# Patient Record
Sex: MALE | Race: WHITE | NOT HISPANIC OR LATINO | URBAN - METROPOLITAN AREA
[De-identification: names, ages, dates, MRNs, and addresses within clinical notes are randomized per-mention and may not be internally consistent; named-entity substitution may affect disease eponyms.]

---

## 2022-07-09 ENCOUNTER — TELEPHONE ENCOUNTER (OUTPATIENT)
Dept: URBAN - METROPOLITAN AREA CLINIC 121 | Facility: CLINIC | Age: 37
End: 2022-07-09

## 2022-07-10 ENCOUNTER — TELEPHONE ENCOUNTER (OUTPATIENT)
Dept: URBAN - METROPOLITAN AREA CLINIC 121 | Facility: CLINIC | Age: 37
End: 2022-07-10

## 2023-06-08 ENCOUNTER — TELEPHONE ENCOUNTER (OUTPATIENT)
Dept: URBAN - METROPOLITAN AREA CLINIC 7 | Facility: CLINIC | Age: 38
End: 2023-06-08

## 2023-06-09 ENCOUNTER — WEB ENCOUNTER (OUTPATIENT)
Dept: URBAN - METROPOLITAN AREA CLINIC 7 | Facility: CLINIC | Age: 38
End: 2023-06-09

## 2023-06-09 ENCOUNTER — LAB OUTSIDE AN ENCOUNTER (OUTPATIENT)
Dept: URBAN - METROPOLITAN AREA CLINIC 7 | Facility: CLINIC | Age: 38
End: 2023-06-09

## 2023-06-09 ENCOUNTER — OFFICE VISIT (OUTPATIENT)
Dept: URBAN - METROPOLITAN AREA CLINIC 7 | Facility: CLINIC | Age: 38
End: 2023-06-09
Payer: COMMERCIAL

## 2023-06-09 VITALS
WEIGHT: 240 LBS | DIASTOLIC BLOOD PRESSURE: 82 MMHG | SYSTOLIC BLOOD PRESSURE: 130 MMHG | TEMPERATURE: 98 F | HEIGHT: 72 IN | BODY MASS INDEX: 32.51 KG/M2

## 2023-06-09 DIAGNOSIS — R68.81 EARLY SATIETY: ICD-10-CM

## 2023-06-09 DIAGNOSIS — K86.89 PANCREATIC INSUFFICIENCY: ICD-10-CM

## 2023-06-09 DIAGNOSIS — K92.1 HEMATOCHEZIA: ICD-10-CM

## 2023-06-09 DIAGNOSIS — R10.84 GENERALIZED ABDOMINAL PAIN: ICD-10-CM

## 2023-06-09 DIAGNOSIS — R10.13 EPIGASTRIC PAIN: ICD-10-CM

## 2023-06-09 DIAGNOSIS — A04.8 H. PYLORI INFECTION: ICD-10-CM

## 2023-06-09 DIAGNOSIS — R19.4 CHANGE IN BOWEL HABITS: ICD-10-CM

## 2023-06-09 PROBLEM — 37992001: Status: ACTIVE | Noted: 2023-06-09

## 2023-06-09 PROBLEM — 721730009: Status: ACTIVE | Noted: 2023-06-09

## 2023-06-09 PROBLEM — 102614006: Status: ACTIVE | Noted: 2023-06-09

## 2023-06-09 PROBLEM — 88111009: Status: ACTIVE | Noted: 2023-06-09

## 2023-06-09 PROBLEM — 449341000124102: Status: ACTIVE | Noted: 2023-06-09

## 2023-06-09 PROBLEM — 442076002: Status: ACTIVE | Noted: 2023-06-09

## 2023-06-09 PROBLEM — 79922009: Status: ACTIVE | Noted: 2023-06-09

## 2023-06-09 PROCEDURE — 99204 OFFICE O/P NEW MOD 45 MIN: CPT | Performed by: INTERNAL MEDICINE

## 2023-06-09 NOTE — HPI-TODAY'S VISIT:
Patient  of Dr Muir with longstanding hx of gi sxs and prior dx of IBSd and lactose intolerance. Recent stool testing + for h pylori and is currently on rx intergrative rx for this per Dr Muir  Also has mild decrease in fecal pancreatic elastase. Has noted exacerbation of gi complaints since initiated rx with  a complaint of abdominal  pain and change in bowel habits. Had acute onset hematochezia yesterday. The pain is of  days years  duration. The pain is described as sharp, severe constant pain yesterday but today more intermittent and dull. It is localized to the  epigastrium,LUQ. It is non radiating . Exacerbating factors - eating.Has noted nausea,dyspepsia and that sxs worse postprandially. Higher fat meals are worse and now taking enzyme replacement with some improvement. Unrelated to change in position ,activity. Relieving factors - none. Severity  moderate,severe. Associated with nausea and intermittent vomiting . Has noted early satiety and dyspepsia. No recent fevers or chills Aprox 10lb  weight loss over last week but has aprox 40lb weight loss x last  year. No dark urine or alcoholic stools. No significant NSAID use history.

## 2023-06-15 ENCOUNTER — CLAIMS CREATED FROM THE CLAIM WINDOW (OUTPATIENT)
Dept: URBAN - METROPOLITAN AREA CLINIC 4 | Facility: CLINIC | Age: 38
End: 2023-06-15
Payer: COMMERCIAL

## 2023-06-15 ENCOUNTER — CLAIMS CREATED FROM THE CLAIM WINDOW (OUTPATIENT)
Dept: URBAN - METROPOLITAN AREA SURGERY CENTER 5 | Facility: SURGERY CENTER | Age: 38
End: 2023-06-15
Payer: COMMERCIAL

## 2023-06-15 DIAGNOSIS — R10.13 EPIGASTRIC PAIN: ICD-10-CM

## 2023-06-15 DIAGNOSIS — Z87.19 PERSONAL HISTORY OF OTHER DISEASES OF THE DIGESTIVE SYSTEM: ICD-10-CM

## 2023-06-15 DIAGNOSIS — K29.70 GASTRITIS, UNSPECIFIED, WITHOUT BLEEDING: ICD-10-CM

## 2023-06-15 DIAGNOSIS — K31.89 OTHER DISEASES OF STOMACH AND DUODENUM: ICD-10-CM

## 2023-06-15 PROCEDURE — 88305 TISSUE EXAM BY PATHOLOGIST: CPT | Performed by: PATHOLOGY

## 2023-06-15 PROCEDURE — 00731 ANES UPR GI NDSC PX NOS: CPT | Performed by: NURSE ANESTHETIST, CERTIFIED REGISTERED

## 2023-06-15 PROCEDURE — 43239 EGD BIOPSY SINGLE/MULTIPLE: CPT | Performed by: INTERNAL MEDICINE

## 2023-06-15 PROCEDURE — 88312 SPECIAL STAINS GROUP 1: CPT | Performed by: PATHOLOGY

## 2023-06-22 ENCOUNTER — CLAIMS CREATED FROM THE CLAIM WINDOW (OUTPATIENT)
Dept: URBAN - METROPOLITAN AREA SURGERY CENTER 5 | Facility: SURGERY CENTER | Age: 38
End: 2023-06-22
Payer: COMMERCIAL

## 2023-06-22 DIAGNOSIS — K63.5 POLYP OF ASCENDING COLON, UNSPECIFIED TYPE: ICD-10-CM

## 2023-06-22 DIAGNOSIS — K63.5 INFLAMMATORY COLON POLYP: ICD-10-CM

## 2023-06-22 DIAGNOSIS — K92.1 HEMATOCHEZIA: ICD-10-CM

## 2023-06-22 DIAGNOSIS — K64.1 SECOND DEGREE HEMORRHOIDS: ICD-10-CM

## 2023-06-22 PROCEDURE — 00811 ANES LWR INTST NDSC NOS: CPT | Performed by: NURSE ANESTHETIST, CERTIFIED REGISTERED

## 2023-06-22 PROCEDURE — 45385 COLONOSCOPY W/LESION REMOVAL: CPT | Performed by: INTERNAL MEDICINE

## 2023-07-06 ENCOUNTER — TELEPHONE ENCOUNTER (OUTPATIENT)
Dept: URBAN - METROPOLITAN AREA CLINIC 7 | Facility: CLINIC | Age: 38
End: 2023-07-06

## 2023-07-13 ENCOUNTER — LAB OUTSIDE AN ENCOUNTER (OUTPATIENT)
Dept: URBAN - METROPOLITAN AREA CLINIC 7 | Facility: CLINIC | Age: 38
End: 2023-07-13

## 2023-07-13 ENCOUNTER — TELEPHONE ENCOUNTER (OUTPATIENT)
Dept: URBAN - METROPOLITAN AREA CLINIC 7 | Facility: CLINIC | Age: 38
End: 2023-07-13

## 2023-07-24 ENCOUNTER — OFFICE VISIT (OUTPATIENT)
Dept: URBAN - METROPOLITAN AREA CLINIC 7 | Facility: CLINIC | Age: 38
End: 2023-07-24
Payer: COMMERCIAL

## 2023-07-24 VITALS
TEMPERATURE: 98 F | DIASTOLIC BLOOD PRESSURE: 74 MMHG | HEART RATE: 80 BPM | SYSTOLIC BLOOD PRESSURE: 122 MMHG | HEIGHT: 72 IN

## 2023-07-24 DIAGNOSIS — K64.1 GRADE II HEMORRHOIDS: ICD-10-CM

## 2023-07-24 PROCEDURE — 46221 LIGATION OF HEMORRHOID(S): CPT | Performed by: INTERNAL MEDICINE

## 2023-08-01 ENCOUNTER — TELEPHONE ENCOUNTER (OUTPATIENT)
Dept: URBAN - METROPOLITAN AREA CLINIC 7 | Facility: CLINIC | Age: 38
End: 2023-08-01

## 2023-08-07 ENCOUNTER — OFFICE VISIT (OUTPATIENT)
Dept: URBAN - METROPOLITAN AREA CLINIC 7 | Facility: CLINIC | Age: 38
End: 2023-08-07
Payer: COMMERCIAL

## 2023-08-07 VITALS
RESPIRATION RATE: 99 BRPM | HEIGHT: 72 IN | TEMPERATURE: 98 F | HEART RATE: 74 BPM | SYSTOLIC BLOOD PRESSURE: 116 MMHG | DIASTOLIC BLOOD PRESSURE: 68 MMHG

## 2023-08-07 DIAGNOSIS — K64.1 GRADE II HEMORRHOIDS: ICD-10-CM

## 2023-08-07 PROCEDURE — 46221 LIGATION OF HEMORRHOID(S): CPT | Performed by: INTERNAL MEDICINE

## 2023-08-08 ENCOUNTER — TELEPHONE ENCOUNTER (OUTPATIENT)
Dept: URBAN - METROPOLITAN AREA CLINIC 7 | Facility: CLINIC | Age: 38
End: 2023-08-08

## 2023-08-22 ENCOUNTER — OFFICE VISIT (OUTPATIENT)
Dept: URBAN - METROPOLITAN AREA CLINIC 7 | Facility: CLINIC | Age: 38
End: 2023-08-22
Payer: COMMERCIAL

## 2023-08-22 VITALS
RESPIRATION RATE: 68 BRPM | SYSTOLIC BLOOD PRESSURE: 116 MMHG | TEMPERATURE: 98 F | DIASTOLIC BLOOD PRESSURE: 80 MMHG | HEIGHT: 72 IN

## 2023-08-22 DIAGNOSIS — K64.1 GRADE II HEMORRHOIDS: ICD-10-CM

## 2023-08-22 PROCEDURE — 46221 LIGATION OF HEMORRHOID(S): CPT | Performed by: INTERNAL MEDICINE

## 2023-09-21 ENCOUNTER — TELEPHONE ENCOUNTER (OUTPATIENT)
Dept: URBAN - METROPOLITAN AREA CLINIC 7 | Facility: CLINIC | Age: 38
End: 2023-09-21

## 2023-09-22 ENCOUNTER — OFFICE VISIT (OUTPATIENT)
Dept: URBAN - METROPOLITAN AREA CLINIC 7 | Facility: CLINIC | Age: 38
End: 2023-09-22
Payer: COMMERCIAL

## 2023-09-22 ENCOUNTER — LAB OUTSIDE AN ENCOUNTER (OUTPATIENT)
Dept: URBAN - METROPOLITAN AREA CLINIC 7 | Facility: CLINIC | Age: 38
End: 2023-09-22

## 2023-09-22 VITALS
DIASTOLIC BLOOD PRESSURE: 82 MMHG | HEIGHT: 72 IN | WEIGHT: 231 LBS | TEMPERATURE: 98.9 F | SYSTOLIC BLOOD PRESSURE: 130 MMHG | BODY MASS INDEX: 31.29 KG/M2

## 2023-09-22 DIAGNOSIS — Z86.19 HISTORY OF HELICOBACTER PYLORI INFECTION: ICD-10-CM

## 2023-09-22 DIAGNOSIS — R10.32 LLQ ABDOMINAL PAIN: ICD-10-CM

## 2023-09-22 DIAGNOSIS — R11.0 NAUSEA: ICD-10-CM

## 2023-09-22 DIAGNOSIS — K86.89 PANCREATIC INSUFFICIENCY: ICD-10-CM

## 2023-09-22 PROBLEM — 422587007: Status: ACTIVE | Noted: 2023-09-22

## 2023-09-22 PROBLEM — 15627741000119108: Status: ACTIVE | Noted: 2023-09-22

## 2023-09-22 PROBLEM — 301716002: Status: ACTIVE | Noted: 2023-09-22

## 2023-09-22 PROCEDURE — 99214 OFFICE O/P EST MOD 30 MIN: CPT | Performed by: INTERNAL MEDICINE

## 2023-09-22 RX ORDER — PSYLLIUM SEED
AS DIRECTED PACKET (EA) ORAL
Status: ACTIVE | COMMUNITY

## 2023-09-22 NOTE — HPI-TODAY'S VISIT:
Seen back now with recurrent intermittent LLQ pain Sharp  Gen 3/10 with intermittent exacerbation up to 7/10 No clear exacerbating or relieving factors When increased will have nausea. No emesis No bleeding Colonosocpy 6/2023 without finding in LLQ. EGD bx negative for h pylori.   Patient  of Dr Muir  last seen with longstanding hx of gi sxs and prior dx of IBSd and lactose intolerance. Recent stool testing + for h pylori and is currently on rx intergrative rx for this per Dr Muir  Also has mild decrease in fecal pancreatic elastase. Has noted exacerbation of gi complaints since initiated rx with  a complaint of abdominal  pain and change in bowel habits. Had acute onset hematochezia yesterday. The pain is of  days years  duration. The pain is described as sharp, severe constant pain yesterday but today more intermittent and dull. It is localized to the  epigastrium,LUQ. It is non radiating . Exacerbating factors - eating.Has noted nausea,dyspepsia and that sxs worse postprandially. Higher fat meals are worse and now taking enzyme replacement with some improvement. Unrelated to change in position ,activity. Relieving factors - none. Severity  moderate,severe. Associated with nausea and intermittent vomiting . Has noted early satiety and dyspepsia. No recent fevers or chills Aprox 10lb  weight loss over last week but has aprox 40lb weight loss x last  year. No dark urine or alcoholic stools. No significant NSAID use history.

## 2023-10-02 ENCOUNTER — LAB OUTSIDE AN ENCOUNTER (OUTPATIENT)
Dept: URBAN - METROPOLITAN AREA CLINIC 7 | Facility: CLINIC | Age: 38
End: 2023-10-02

## 2023-10-03 ENCOUNTER — TELEPHONE ENCOUNTER (OUTPATIENT)
Dept: URBAN - METROPOLITAN AREA CLINIC 7 | Facility: CLINIC | Age: 38
End: 2023-10-03

## 2023-10-16 ENCOUNTER — OFFICE VISIT (OUTPATIENT)
Dept: URBAN - METROPOLITAN AREA CLINIC 7 | Facility: CLINIC | Age: 38
End: 2023-10-16
Payer: COMMERCIAL

## 2023-10-16 ENCOUNTER — DASHBOARD ENCOUNTERS (OUTPATIENT)
Age: 38
End: 2023-10-16

## 2023-10-16 ENCOUNTER — TELEPHONE ENCOUNTER (OUTPATIENT)
Dept: URBAN - METROPOLITAN AREA CLINIC 7 | Facility: CLINIC | Age: 38
End: 2023-10-16

## 2023-10-16 VITALS
TEMPERATURE: 98 F | DIASTOLIC BLOOD PRESSURE: 76 MMHG | SYSTOLIC BLOOD PRESSURE: 122 MMHG | WEIGHT: 236 LBS | BODY MASS INDEX: 31.97 KG/M2 | HEIGHT: 72 IN

## 2023-10-16 DIAGNOSIS — Z86.19 HISTORY OF HELICOBACTER PYLORI INFECTION: ICD-10-CM

## 2023-10-16 DIAGNOSIS — K86.89 PANCREATIC INSUFFICIENCY: ICD-10-CM

## 2023-10-16 DIAGNOSIS — A04.8 H. PYLORI INFECTION: ICD-10-CM

## 2023-10-16 DIAGNOSIS — K57.92 ACUTE DIVERTICULITIS: ICD-10-CM

## 2023-10-16 PROBLEM — 735593008: Status: ACTIVE | Noted: 2023-10-16

## 2023-10-16 PROCEDURE — 99214 OFFICE O/P EST MOD 30 MIN: CPT | Performed by: INTERNAL MEDICINE

## 2023-10-16 RX ORDER — PANCRELIPASE LIPASE, PANCRELIPASE AMYLASE, AND PANCRELIPASE PROTEASE 21000; 83900; 54700 [USP'U]/1; [USP'U]/1; [USP'U]/1
ONE TABLET WITH EACH MEAL DAILY CAPSULE, DELAYED RELEASE ORAL
Qty: 90 | Refills: 11 | OUTPATIENT
Start: 2023-10-19 | End: 2024-10-13

## 2023-10-16 RX ORDER — PSYLLIUM SEED
AS DIRECTED PACKET (EA) ORAL
Status: ACTIVE | COMMUNITY

## 2023-10-16 RX ORDER — AMOXICILLIN AND CLAVULANATE POTASSIUM 875; 125 MG/1; MG/1
1 TABLET TABLET, FILM COATED ORAL
Qty: 20 | Refills: 0 | OUTPATIENT
Start: 2023-10-16 | End: 2023-10-26

## 2023-10-16 RX ORDER — PANCRELIPASE LIPASE, PANCRELIPASE PROTEASE, PANCRELIPASE AMYLASE 10000; 32000; 42000 [USP'U]/1; [USP'U]/1; [USP'U]/1
1WITH MEALS CAPSULE, DELAYED RELEASE ORAL
Qty: 90 | Refills: 0 | OUTPATIENT
Start: 2023-10-16 | End: 2023-11-14

## 2023-10-16 RX ORDER — METRONIDAZOLE 500 MG/1
1 TABLET TABLET ORAL THREE TIMES A DAY
Qty: 30 | Refills: 0 | OUTPATIENT
Start: 2023-10-16 | End: 2023-10-26

## 2023-10-16 NOTE — HPI-TODAY'S VISIT:
Seen back now with vague LUQ/LLQ/Flank pain. Intermittent ,mild and recurrent  Last seen with  LLQ pain and had  CT with mild diverticulitis. Sharp  Gen 3/10 with intermittent exacerbation up to 7/10 No clear exacerbating or relieving factors When increased will have nausea. No emesis No bleeding Colonosocpy 6/2023 without finding in LLQ. EGD bx negative for h pylori.   Patient  of Dr Muir  last seen with longstanding hx of gi sxs and prior dx of IBSd and lactose intolerance. Recent stool testing + for h pylori and is currently on rx intergrative rx for this per Dr Muir  Also has mild decrease in fecal pancreatic elastase. Has noted exacerbation of gi complaints since initiated rx with  a complaint of abdominal  pain and change in bowel habits. Had acute onset hematochezia yesterday. The pain is of  days years  duration. The pain is described as sharp, severe constant pain yesterday but today more intermittent and dull. It is localized to the  epigastrium,LUQ. It is non radiating . Exacerbating factors - eating.Has noted nausea,dyspepsia and that sxs worse postprandially. Higher fat meals are worse and now taking enzyme replacement with some improvement. Unrelated to change in position ,activity. Relieving factors - none. Severity  moderate,severe. Associated with nausea and intermittent vomiting . Has noted early satiety and dyspepsia. No recent fevers or chills Aprox 10lb  weight loss over last week but has aprox 40lb weight loss x last  year. No dark urine or alcoholic stools. No significant NSAID use history.

## 2023-10-20 ENCOUNTER — TELEPHONE ENCOUNTER (OUTPATIENT)
Dept: URBAN - METROPOLITAN AREA CLINIC 7 | Facility: CLINIC | Age: 38
End: 2023-10-20

## 2023-10-20 RX ORDER — PANCRELIPASE LIPASE, PANCRELIPASE PROTEASE, PANCRELIPASE AMYLASE 10000; 32000; 42000 [USP'U]/1; [USP'U]/1; [USP'U]/1
1WITH MEALS CAPSULE, DELAYED RELEASE ORAL
Start: 2023-10-16

## 2023-11-06 ENCOUNTER — TELEPHONE ENCOUNTER (OUTPATIENT)
Dept: URBAN - METROPOLITAN AREA CLINIC 7 | Facility: CLINIC | Age: 38
End: 2023-11-06

## 2023-11-15 ENCOUNTER — TELEPHONE ENCOUNTER (OUTPATIENT)
Dept: URBAN - METROPOLITAN AREA CLINIC 7 | Facility: CLINIC | Age: 38
End: 2023-11-15

## 2023-11-15 ENCOUNTER — LAB OUTSIDE AN ENCOUNTER (OUTPATIENT)
Dept: URBAN - METROPOLITAN AREA CLINIC 7 | Facility: CLINIC | Age: 38
End: 2023-11-15

## 2023-11-15 RX ORDER — AMOXICILLIN AND CLAVULANATE POTASSIUM 500; 125 MG/1; 1/1
1 TABLET TABLET, FILM COATED ORAL THREE TIMES A DAY
Qty: 30 TABLET | Refills: 0 | OUTPATIENT
Start: 2023-11-15 | End: 2023-11-25

## 2023-11-16 ENCOUNTER — TELEPHONE ENCOUNTER (OUTPATIENT)
Dept: URBAN - METROPOLITAN AREA CLINIC 7 | Facility: CLINIC | Age: 38
End: 2023-11-16

## 2023-11-20 ENCOUNTER — TELEPHONE ENCOUNTER (OUTPATIENT)
Dept: URBAN - METROPOLITAN AREA CLINIC 7 | Facility: CLINIC | Age: 38
End: 2023-11-20

## 2023-11-20 RX ORDER — PANCRELIPASE LIPASE, PANCRELIPASE PROTEASE, PANCRELIPASE AMYLASE 10000; 32000; 42000 [USP'U]/1; [USP'U]/1; [USP'U]/1
1WITH MEALS CAPSULE, DELAYED RELEASE ORAL
Start: 2023-10-16

## 2023-11-21 ENCOUNTER — TELEPHONE ENCOUNTER (OUTPATIENT)
Dept: URBAN - METROPOLITAN AREA CLINIC 7 | Facility: CLINIC | Age: 38
End: 2023-11-21

## 2023-12-29 ENCOUNTER — TELEPHONE ENCOUNTER (OUTPATIENT)
Dept: URBAN - METROPOLITAN AREA CLINIC 7 | Facility: CLINIC | Age: 38
End: 2023-12-29

## 2024-01-09 ENCOUNTER — TELEPHONE ENCOUNTER (OUTPATIENT)
Dept: URBAN - METROPOLITAN AREA CLINIC 7 | Facility: CLINIC | Age: 39
End: 2024-01-09

## 2024-01-12 ENCOUNTER — TELEPHONE ENCOUNTER (OUTPATIENT)
Dept: URBAN - METROPOLITAN AREA CLINIC 7 | Facility: CLINIC | Age: 39
End: 2024-01-12

## 2024-01-12 ENCOUNTER — LAB OUTSIDE AN ENCOUNTER (OUTPATIENT)
Dept: URBAN - METROPOLITAN AREA CLINIC 7 | Facility: CLINIC | Age: 39
End: 2024-01-12

## 2024-01-29 ENCOUNTER — TELEPHONE ENCOUNTER (OUTPATIENT)
Dept: URBAN - METROPOLITAN AREA CLINIC 7 | Facility: CLINIC | Age: 39
End: 2024-01-29

## 2024-01-29 RX ORDER — ONDANSETRON 4 MG/1
1 TABLET ON THE TONGUE AND ALLOW TO DISSOLVE TABLET, ORALLY DISINTEGRATING ORAL ONCE A DAY
Qty: 30 | Refills: 0 | OUTPATIENT
Start: 2024-01-29

## 2024-05-08 ENCOUNTER — TELEPHONE ENCOUNTER (OUTPATIENT)
Dept: URBAN - METROPOLITAN AREA CLINIC 9 | Facility: CLINIC | Age: 39
End: 2024-05-08

## 2024-05-08 ENCOUNTER — WEB ENCOUNTER (OUTPATIENT)
Dept: URBAN - METROPOLITAN AREA CLINIC 7 | Facility: CLINIC | Age: 39
End: 2024-05-08

## 2024-05-08 RX ORDER — AMOXICILLIN AND CLAVULANATE POTASSIUM 500; 125 MG/1; 1/1
1 TABLET TABLET, FILM COATED ORAL
Qty: 30 TABLET | Refills: 0 | OUTPATIENT
Start: 2024-05-08 | End: 2024-05-18

## 2024-06-17 ENCOUNTER — TELEPHONE ENCOUNTER (OUTPATIENT)
Dept: URBAN - METROPOLITAN AREA CLINIC 7 | Facility: CLINIC | Age: 39
End: 2024-06-17

## 2024-07-05 ENCOUNTER — OFFICE VISIT (OUTPATIENT)
Dept: URBAN - METROPOLITAN AREA CLINIC 7 | Facility: CLINIC | Age: 39
End: 2024-07-05

## 2024-07-10 ENCOUNTER — WEB ENCOUNTER (OUTPATIENT)
Dept: URBAN - METROPOLITAN AREA CLINIC 7 | Facility: CLINIC | Age: 39
End: 2024-07-10

## 2024-07-10 RX ORDER — ONDANSETRON 4 MG/1
1 TABLET ON THE TONGUE AND ALLOW TO DISSOLVE TABLET, ORALLY DISINTEGRATING ORAL ONCE A DAY
Qty: 30 | Refills: 0
Start: 2024-01-29

## 2024-07-16 ENCOUNTER — OFFICE VISIT (OUTPATIENT)
Dept: URBAN - METROPOLITAN AREA CLINIC 7 | Facility: CLINIC | Age: 39
End: 2024-07-16

## 2024-07-16 ENCOUNTER — LAB OUTSIDE AN ENCOUNTER (OUTPATIENT)
Dept: URBAN - METROPOLITAN AREA CLINIC 7 | Facility: CLINIC | Age: 39
End: 2024-07-16

## 2024-07-16 ENCOUNTER — OFFICE VISIT (OUTPATIENT)
Dept: URBAN - METROPOLITAN AREA CLINIC 7 | Facility: CLINIC | Age: 39
End: 2024-07-16
Payer: COMMERCIAL

## 2024-07-16 VITALS
TEMPERATURE: 97.6 F | BODY MASS INDEX: 32.23 KG/M2 | SYSTOLIC BLOOD PRESSURE: 130 MMHG | DIASTOLIC BLOOD PRESSURE: 90 MMHG | HEIGHT: 72 IN | WEIGHT: 238 LBS

## 2024-07-16 DIAGNOSIS — Z86.19 HISTORY OF HELICOBACTER PYLORI INFECTION: ICD-10-CM

## 2024-07-16 DIAGNOSIS — K59.09 INTERMITTENT CONSTIPATION: ICD-10-CM

## 2024-07-16 DIAGNOSIS — K86.89 PANCREATIC INSUFFICIENCY: ICD-10-CM

## 2024-07-16 DIAGNOSIS — K64.9 BLEEDING HEMORRHOID: ICD-10-CM

## 2024-07-16 PROCEDURE — 99214 OFFICE O/P EST MOD 30 MIN: CPT | Performed by: INTERNAL MEDICINE

## 2024-07-16 RX ORDER — PSYLLIUM SEED
AS DIRECTED PACKET (EA) ORAL
Status: ACTIVE | COMMUNITY

## 2024-07-16 RX ORDER — ONDANSETRON 4 MG/1
1 TABLET ON THE TONGUE AND ALLOW TO DISSOLVE TABLET, ORALLY DISINTEGRATING ORAL ONCE A DAY
Qty: 30 | Refills: 0 | Status: ON HOLD | COMMUNITY
Start: 2024-01-29

## 2024-07-16 RX ORDER — PANCRELIPASE LIPASE, PANCRELIPASE AMYLASE, AND PANCRELIPASE PROTEASE 21000; 83900; 54700 [USP'U]/1; [USP'U]/1; [USP'U]/1
ONE TABLET WITH EACH MEAL DAILY CAPSULE, DELAYED RELEASE ORAL
Qty: 90 | Refills: 11 | Status: ON HOLD | COMMUNITY
Start: 2023-10-19 | End: 2024-10-13

## 2024-07-16 RX ORDER — PANCRELIPASE 24000; 90750; 86250 [USP'U]/1; [USP'U]/1; [USP'U]/1
ONE WITH SNACKS, TWO WITH MEALS, UP TO 8 MAXIMUM DAILY CAPSULE, DELAYED RELEASE ORAL
Qty: 240 | Refills: 11 | Status: ACTIVE | COMMUNITY
Start: 2024-02-09 | End: 2025-02-06

## 2024-07-16 RX ORDER — PANCRELIPASE LIPASE, PANCRELIPASE PROTEASE, PANCRELIPASE AMYLASE 10000; 32000; 42000 [USP'U]/1; [USP'U]/1; [USP'U]/1
1WITH MEALS CAPSULE, DELAYED RELEASE ORAL
Status: ON HOLD | COMMUNITY
Start: 2023-10-16

## 2024-07-16 NOTE — HPI-TODAY'S VISIT:
Seen back now with a change in bowel habits /constipation that appears to follow initiation of pancreatic enzyme replacement for EPI. Has added metamucil prn and feels this has been helpful. S/P rx H pylori. Intermittent hemorrhoidal sxs since may with discomfort ,wipe associated bleeding after BMs. s/p colonoscopy 2023 with inflammatory polyp and hemorrhoids.. good prior repsonse to [facility] but sxs now recurred.  Last seen with  vague LUQ/LLQ/Flank pain. Intermittent ,mild and recurrent  Last seen with  LLQ pain and had  CT with mild diverticulitis. Sharp  Gen 3/10 with intermittent exacerbation up to 7/10 No clear exacerbating or relieving factors When increased will have nausea. No emesis No bleeding Colonosocpy 6/2023 without finding in LLQ. EGD bx negative for h pylori.   Patient  of Dr Muir  last seen with longstanding hx of gi sxs and prior dx of IBSd and lactose intolerance. Recent stool testing + for h pylori and is currently on rx intergrative rx for this per Dr Muir  Also has mild decrease in fecal pancreatic elastase. Has noted exacerbation of gi complaints since initiated rx with  a complaint of abdominal  pain and change in bowel habits. Had acute onset hematochezia yesterday. The pain is of  days years  duration. The pain is described as sharp, severe constant pain yesterday but today more intermittent and dull. It is localized to the  epigastrium,LUQ. It is non radiating . Exacerbating factors - eating.Has noted nausea,dyspepsia and that sxs worse postprandially. Higher fat meals are worse and now taking enzyme replacement with some improvement. Unrelated to change in position ,activity. Relieving factors - none. Severity  moderate,severe. Associated with nausea and intermittent vomiting . Has noted early satiety and dyspepsia. No recent fevers or chills Aprox 10lb  weight loss over last week but has aprox 40lb weight loss x last  year. No dark urine or alcoholic stools. No significant NSAID use history.

## 2024-08-02 ENCOUNTER — OFFICE VISIT (OUTPATIENT)
Dept: URBAN - METROPOLITAN AREA CLINIC 7 | Facility: CLINIC | Age: 39
End: 2024-08-02
Payer: COMMERCIAL

## 2024-08-02 VITALS — DIASTOLIC BLOOD PRESSURE: 82 MMHG | SYSTOLIC BLOOD PRESSURE: 120 MMHG | HEIGHT: 72 IN

## 2024-08-02 DIAGNOSIS — K64.1 GRADE II HEMORRHOIDS: ICD-10-CM

## 2024-08-02 PROCEDURE — 46221 LIGATION OF HEMORRHOID(S): CPT | Performed by: INTERNAL MEDICINE

## 2024-08-02 RX ORDER — PANCRELIPASE 24000; 90750; 86250 [USP'U]/1; [USP'U]/1; [USP'U]/1
ONE WITH SNACKS, TWO WITH MEALS, UP TO 8 MAXIMUM DAILY CAPSULE, DELAYED RELEASE ORAL
Qty: 240 | Refills: 11 | Status: ACTIVE | COMMUNITY
Start: 2024-02-09 | End: 2025-02-06

## 2024-08-02 RX ORDER — PANCRELIPASE LIPASE, PANCRELIPASE PROTEASE, PANCRELIPASE AMYLASE 10000; 32000; 42000 [USP'U]/1; [USP'U]/1; [USP'U]/1
1WITH MEALS CAPSULE, DELAYED RELEASE ORAL
Status: ON HOLD | COMMUNITY
Start: 2023-10-16

## 2024-08-02 RX ORDER — PSYLLIUM SEED
AS DIRECTED PACKET (EA) ORAL
Status: ACTIVE | COMMUNITY

## 2024-08-02 RX ORDER — PANCRELIPASE LIPASE, PANCRELIPASE AMYLASE, AND PANCRELIPASE PROTEASE 21000; 83900; 54700 [USP'U]/1; [USP'U]/1; [USP'U]/1
ONE TABLET WITH EACH MEAL DAILY CAPSULE, DELAYED RELEASE ORAL
Qty: 90 | Refills: 11 | Status: ON HOLD | COMMUNITY
Start: 2023-10-19 | End: 2024-10-13

## 2024-08-02 RX ORDER — ONDANSETRON 4 MG/1
1 TABLET ON THE TONGUE AND ALLOW TO DISSOLVE TABLET, ORALLY DISINTEGRATING ORAL ONCE A DAY
Qty: 30 | Refills: 0 | Status: ON HOLD | COMMUNITY
Start: 2024-01-29

## 2024-08-16 ENCOUNTER — OFFICE VISIT (OUTPATIENT)
Dept: URBAN - METROPOLITAN AREA CLINIC 7 | Facility: CLINIC | Age: 39
End: 2024-08-16
Payer: COMMERCIAL

## 2024-08-16 VITALS — SYSTOLIC BLOOD PRESSURE: 100 MMHG | HEIGHT: 72 IN | DIASTOLIC BLOOD PRESSURE: 58 MMHG

## 2024-08-16 DIAGNOSIS — K64.1 GRADE II HEMORRHOIDS: ICD-10-CM

## 2024-08-16 PROCEDURE — 46221 LIGATION OF HEMORRHOID(S): CPT | Performed by: INTERNAL MEDICINE

## 2024-08-16 RX ORDER — PANCRELIPASE LIPASE, PANCRELIPASE PROTEASE, PANCRELIPASE AMYLASE 10000; 32000; 42000 [USP'U]/1; [USP'U]/1; [USP'U]/1
1WITH MEALS CAPSULE, DELAYED RELEASE ORAL
Status: ON HOLD | COMMUNITY
Start: 2023-10-16

## 2024-08-16 RX ORDER — ONDANSETRON 4 MG/1
1 TABLET ON THE TONGUE AND ALLOW TO DISSOLVE TABLET, ORALLY DISINTEGRATING ORAL ONCE A DAY
Qty: 30 | Refills: 0 | Status: ON HOLD | COMMUNITY
Start: 2024-01-29

## 2024-08-16 RX ORDER — PANCRELIPASE 24000; 90750; 86250 [USP'U]/1; [USP'U]/1; [USP'U]/1
ONE WITH SNACKS, TWO WITH MEALS, UP TO 8 MAXIMUM DAILY CAPSULE, DELAYED RELEASE ORAL
Qty: 240 | Refills: 11 | Status: ACTIVE | COMMUNITY
Start: 2024-02-09 | End: 2025-02-06

## 2024-08-16 RX ORDER — PANCRELIPASE LIPASE, PANCRELIPASE AMYLASE, AND PANCRELIPASE PROTEASE 21000; 83900; 54700 [USP'U]/1; [USP'U]/1; [USP'U]/1
ONE TABLET WITH EACH MEAL DAILY CAPSULE, DELAYED RELEASE ORAL
Qty: 90 | Refills: 11 | Status: ON HOLD | COMMUNITY
Start: 2023-10-19 | End: 2024-10-13

## 2024-08-16 RX ORDER — PSYLLIUM SEED
AS DIRECTED PACKET (EA) ORAL
Status: ACTIVE | COMMUNITY

## 2024-08-30 ENCOUNTER — OFFICE VISIT (OUTPATIENT)
Dept: URBAN - METROPOLITAN AREA CLINIC 7 | Facility: CLINIC | Age: 39
End: 2024-08-30
Payer: COMMERCIAL

## 2024-08-30 VITALS — HEIGHT: 72 IN | DIASTOLIC BLOOD PRESSURE: 62 MMHG | SYSTOLIC BLOOD PRESSURE: 138 MMHG

## 2024-08-30 DIAGNOSIS — K64.1 GRADE II HEMORRHOIDS: ICD-10-CM

## 2024-08-30 PROCEDURE — 46221 LIGATION OF HEMORRHOID(S): CPT | Performed by: INTERNAL MEDICINE

## 2024-08-30 RX ORDER — PANCRELIPASE LIPASE, PANCRELIPASE AMYLASE, AND PANCRELIPASE PROTEASE 21000; 83900; 54700 [USP'U]/1; [USP'U]/1; [USP'U]/1
ONE TABLET WITH EACH MEAL DAILY CAPSULE, DELAYED RELEASE ORAL
Qty: 90 | Refills: 11 | Status: ON HOLD | COMMUNITY
Start: 2023-10-19 | End: 2024-10-13

## 2024-08-30 RX ORDER — PANCRELIPASE LIPASE, PANCRELIPASE PROTEASE, PANCRELIPASE AMYLASE 10000; 32000; 42000 [USP'U]/1; [USP'U]/1; [USP'U]/1
1WITH MEALS CAPSULE, DELAYED RELEASE ORAL
Status: ON HOLD | COMMUNITY
Start: 2023-10-16

## 2024-08-30 RX ORDER — PANCRELIPASE 24000; 90750; 86250 [USP'U]/1; [USP'U]/1; [USP'U]/1
ONE WITH SNACKS, TWO WITH MEALS, UP TO 8 MAXIMUM DAILY CAPSULE, DELAYED RELEASE ORAL
Qty: 240 | Refills: 11 | Status: ACTIVE | COMMUNITY
Start: 2024-02-09 | End: 2025-02-06

## 2024-08-30 RX ORDER — ONDANSETRON 4 MG/1
1 TABLET ON THE TONGUE AND ALLOW TO DISSOLVE TABLET, ORALLY DISINTEGRATING ORAL ONCE A DAY
Qty: 30 | Refills: 0 | Status: ON HOLD | COMMUNITY
Start: 2024-01-29

## 2024-08-30 RX ORDER — PSYLLIUM SEED
AS DIRECTED PACKET (EA) ORAL
Status: ACTIVE | COMMUNITY

## 2024-09-17 ENCOUNTER — TELEPHONE ENCOUNTER (OUTPATIENT)
Dept: URBAN - METROPOLITAN AREA CLINIC 7 | Facility: CLINIC | Age: 39
End: 2024-09-17

## 2024-09-17 RX ORDER — AMOXICILLIN AND CLAVULANATE POTASSIUM 500; 125 MG/1; 1/1
1 TABLET TABLET, FILM COATED ORAL
Qty: 30 TABLET | Refills: 0 | OUTPATIENT
Start: 2024-09-17 | End: 2024-09-27

## 2024-10-22 ENCOUNTER — TELEPHONE ENCOUNTER (OUTPATIENT)
Dept: URBAN - METROPOLITAN AREA CLINIC 7 | Facility: CLINIC | Age: 39
End: 2024-10-22

## 2024-10-29 ENCOUNTER — TELEPHONE ENCOUNTER (OUTPATIENT)
Dept: URBAN - METROPOLITAN AREA CLINIC 7 | Facility: CLINIC | Age: 39
End: 2024-10-29

## 2024-10-30 ENCOUNTER — LAB OUTSIDE AN ENCOUNTER (OUTPATIENT)
Dept: URBAN - METROPOLITAN AREA CLINIC 7 | Facility: CLINIC | Age: 39
End: 2024-10-30

## 2024-10-30 ENCOUNTER — OFFICE VISIT (OUTPATIENT)
Dept: URBAN - METROPOLITAN AREA CLINIC 7 | Facility: CLINIC | Age: 39
End: 2024-10-30
Payer: COMMERCIAL

## 2024-10-30 VITALS
OXYGEN SATURATION: 98 % | HEIGHT: 72 IN | BODY MASS INDEX: 30.34 KG/M2 | SYSTOLIC BLOOD PRESSURE: 120 MMHG | DIASTOLIC BLOOD PRESSURE: 82 MMHG | HEART RATE: 74 BPM | WEIGHT: 224 LBS | TEMPERATURE: 97 F

## 2024-10-30 DIAGNOSIS — K86.89 PANCREATIC INSUFFICIENCY: ICD-10-CM

## 2024-10-30 DIAGNOSIS — Z86.19 HISTORY OF HELICOBACTER PYLORI INFECTION: ICD-10-CM

## 2024-10-30 DIAGNOSIS — R68.81 EARLY SATIETY: ICD-10-CM

## 2024-10-30 DIAGNOSIS — R10.84 GENERALIZED ABDOMINAL PAIN: ICD-10-CM

## 2024-10-30 DIAGNOSIS — R11.2 NAUSEA AND VOMITING IN ADULT: ICD-10-CM

## 2024-10-30 DIAGNOSIS — R63.4 WEIGHT LOSS, UNINTENTIONAL: ICD-10-CM

## 2024-10-30 PROBLEM — 448765001: Status: ACTIVE | Noted: 2024-10-30

## 2024-10-30 PROBLEM — 16932000: Status: ACTIVE | Noted: 2024-10-30

## 2024-10-30 PROCEDURE — 99214 OFFICE O/P EST MOD 30 MIN: CPT | Performed by: INTERNAL MEDICINE

## 2024-10-30 RX ORDER — PANCRELIPASE 24000; 90750; 86250 [USP'U]/1; [USP'U]/1; [USP'U]/1
ONE WITH SNACKS, TWO WITH MEALS, UP TO 8 MAXIMUM DAILY CAPSULE, DELAYED RELEASE ORAL
Qty: 240 | Refills: 11 | Status: ACTIVE | COMMUNITY
Start: 2024-02-09 | End: 2025-02-06

## 2024-10-30 RX ORDER — PANCRELIPASE LIPASE, PANCRELIPASE PROTEASE, PANCRELIPASE AMYLASE 10000; 32000; 42000 [USP'U]/1; [USP'U]/1; [USP'U]/1
1WITH MEALS CAPSULE, DELAYED RELEASE ORAL
Status: ON HOLD | COMMUNITY
Start: 2023-10-16

## 2024-10-30 RX ORDER — PSYLLIUM SEED
AS DIRECTED PACKET (EA) ORAL
Status: ACTIVE | COMMUNITY

## 2024-10-30 RX ORDER — ONDANSETRON 4 MG/1
1 TABLET ON THE TONGUE AND ALLOW TO DISSOLVE TABLET, ORALLY DISINTEGRATING ORAL ONCE A DAY
Qty: 30 | Refills: 0 | Status: ON HOLD | COMMUNITY
Start: 2024-01-29

## 2024-10-30 NOTE — HPI-TODAY'S VISIT:
Seen back now with hx that over the last 5 weeks has had ER visits x 2 with acute onset severe LUQ pain. Radiates to back and at times down left arm. Associated with sweats,nausea and isloated emesis. Over last weeks has lost aprox 15lb Feels that pain is worse after eating and that within minutes of eating gets pain so caloric intake has decreased. Abd exam unremarkable No renal angle tenderness. No fevers or chills No bleeding. No skin rashes Appetite is diminished Has dc use of medical marijuana over last 2 weeks. CT x 2 without acute finding.  Last seen a change in bowel habits /constipation that appears to follow initiation of pancreatic enzyme replacement for EPI. Has added metamucil prn and feels this has been helpful. S/P rx H pylori. Intermittent hemorrhoidal sxs since may with discomfort ,wipe associated bleeding after BMs. s/p colonoscopy 2023 with inflammatory polyp and hemorrhoids.. good prior repsonse to [facility] but sxs now recurred.  Last seen with  vague LUQ/LLQ/Flank pain. Intermittent ,mild and recurrent  Last seen with  LLQ pain and had  CT with mild diverticulitis. Sharp  Gen 3/10 with intermittent exacerbation up to 7/10 No clear exacerbating or relieving factors When increased will have nausea. No emesis No bleeding Colonosocpy 6/2023 without finding in LLQ. EGD bx negative for h pylori.   Patient  of Dr Muir  last seen with longstanding hx of gi sxs and prior dx of IBSd and lactose intolerance. Recent stool testing + for h pylori and is currently on rx intergrative rx for this per Dr Muir  Also has mild decrease in fecal pancreatic elastase. Has noted exacerbation of gi complaints since initiated rx with  a complaint of abdominal  pain and change in bowel habits. Had acute onset hematochezia yesterday. The pain is of  days years  duration. The pain is described as sharp, severe constant pain yesterday but today more intermittent and dull. It is localized to the  epigastrium,LUQ. It is non radiating . Exacerbating factors - eating.Has noted nausea,dyspepsia and that sxs worse postprandially. Higher fat meals are worse and now taking enzyme replacement with some improvement. Unrelated to change in position ,activity. Relieving factors - none. Severity  moderate,severe. Associated with nausea and intermittent vomiting . Has noted early satiety and dyspepsia. No recent fevers or chills Aprox 10lb  weight loss over last week but has aprox 40lb weight loss x last  year. No dark urine or alcoholic stools. No significant NSAID use history.

## 2024-11-05 ENCOUNTER — LAB OUTSIDE AN ENCOUNTER (OUTPATIENT)
Dept: URBAN - METROPOLITAN AREA CLINIC 7 | Facility: CLINIC | Age: 39
End: 2024-11-05

## 2024-11-05 ENCOUNTER — TELEPHONE ENCOUNTER (OUTPATIENT)
Dept: URBAN - METROPOLITAN AREA CLINIC 7 | Facility: CLINIC | Age: 39
End: 2024-11-05

## 2024-11-27 ENCOUNTER — TELEPHONE ENCOUNTER (OUTPATIENT)
Dept: URBAN - METROPOLITAN AREA CLINIC 7 | Facility: CLINIC | Age: 39
End: 2024-11-27

## 2024-11-27 RX ORDER — AMOXICILLIN AND CLAVULANATE POTASSIUM 500; 125 MG/1; 1/1
1 TABLET TABLET, FILM COATED ORAL
Qty: 30 TABLETS | Refills: 0 | OUTPATIENT
Start: 2024-11-27 | End: 2024-12-07

## 2024-11-27 RX ORDER — METRONIDAZOLE 500 MG/1
1 TABLET TABLET ORAL THREE TIMES A DAY
Qty: 30 TABLET | Refills: 0 | OUTPATIENT
Start: 2024-11-27 | End: 2024-12-07

## 2024-12-02 ENCOUNTER — TELEPHONE ENCOUNTER (OUTPATIENT)
Dept: URBAN - METROPOLITAN AREA CLINIC 7 | Facility: CLINIC | Age: 39
End: 2024-12-02

## 2025-01-21 ENCOUNTER — OFFICE VISIT (OUTPATIENT)
Dept: URBAN - METROPOLITAN AREA CLINIC 7 | Facility: CLINIC | Age: 40
End: 2025-01-21

## 2025-01-21 RX ORDER — PANCRELIPASE LIPASE, PANCRELIPASE PROTEASE, PANCRELIPASE AMYLASE 10000; 32000; 42000 [USP'U]/1; [USP'U]/1; [USP'U]/1
1WITH MEALS CAPSULE, DELAYED RELEASE ORAL
Status: ON HOLD | COMMUNITY
Start: 2023-10-16

## 2025-01-21 RX ORDER — PSYLLIUM SEED
AS DIRECTED PACKET (EA) ORAL
Status: ACTIVE | COMMUNITY

## 2025-01-21 RX ORDER — ONDANSETRON 4 MG/1
1 TABLET ON THE TONGUE AND ALLOW TO DISSOLVE TABLET, ORALLY DISINTEGRATING ORAL ONCE A DAY
Qty: 30 | Refills: 0 | Status: ON HOLD | COMMUNITY
Start: 2024-01-29

## 2025-01-21 RX ORDER — PANCRELIPASE 24000; 90750; 86250 [USP'U]/1; [USP'U]/1; [USP'U]/1
ONE WITH SNACKS, TWO WITH MEALS, UP TO 8 MAXIMUM DAILY CAPSULE, DELAYED RELEASE ORAL
Qty: 240 | Refills: 11 | Status: ACTIVE | COMMUNITY
Start: 2024-02-09 | End: 2025-02-06

## 2025-01-22 ENCOUNTER — TELEPHONE ENCOUNTER (OUTPATIENT)
Dept: URBAN - METROPOLITAN AREA CLINIC 7 | Facility: CLINIC | Age: 40
End: 2025-01-22

## 2025-01-28 ENCOUNTER — TELEPHONE ENCOUNTER (OUTPATIENT)
Dept: URBAN - METROPOLITAN AREA CLINIC 7 | Facility: CLINIC | Age: 40
End: 2025-01-28

## 2025-01-28 RX ORDER — ONDANSETRON 4 MG/1
1 TABLET ON THE TONGUE AND ALLOW TO DISSOLVE TABLET, ORALLY DISINTEGRATING ORAL ONCE A DAY
Qty: 30 | Refills: 1
Start: 2024-01-29